# Patient Record
Sex: MALE | Race: ASIAN | NOT HISPANIC OR LATINO | Employment: UNEMPLOYED | URBAN - METROPOLITAN AREA
[De-identification: names, ages, dates, MRNs, and addresses within clinical notes are randomized per-mention and may not be internally consistent; named-entity substitution may affect disease eponyms.]

---

## 2017-03-31 ENCOUNTER — OFFICE VISIT - HEALTHEAST (OUTPATIENT)
Dept: FAMILY MEDICINE | Facility: CLINIC | Age: 22
End: 2017-03-31

## 2017-03-31 DIAGNOSIS — L30.9 PERIANAL DERMATITIS: ICD-10-CM

## 2017-03-31 RX ORDER — TRIAMCINOLONE ACETONIDE 1 MG/G
CREAM TOPICAL
Qty: 30 G | Refills: 1 | Status: SHIPPED | OUTPATIENT
Start: 2017-03-31

## 2021-05-30 VITALS — BODY MASS INDEX: 23.76 KG/M2 | WEIGHT: 145 LBS

## 2021-06-09 NOTE — PROGRESS NOTES
Subjective:    Ban Sullivan is seen today for irritation perianal region.  Question some mild constipation.  Possible bump in this area.  No history of hemorrhoids.  No bleeding with wiping.  No abdominal discomfort.  No other skin concerns.    No past surgical history on file.     Family History   Problem Relation Age of Onset     No Medical Problems Mother      No Medical Problems Father      Diabetes Paternal Grandmother         No past medical history on file.     Social History   Substance Use Topics     Smoking status: Never Smoker     Smokeless tobacco: None     Alcohol use 0.6 oz/week     1 Cans of beer per week        Current Outpatient Prescriptions   Medication Sig Dispense Refill     triamcinolone (KENALOG) 0.1 % cream apply topically to affected area twice daily as needed 30 g 1     No current facility-administered medications for this visit.           Objective:    Vitals:    03/31/17 1122   BP: 100/60   Pulse: 72   Weight: 145 lb (65.8 kg)      Body mass index is 23.76 kg/(m^2).    Alert.  No apparent distress.  Perianal skin with mild irritation without anal fissure.  No significant external hemorrhoid tissue, skin tag, etc.      Assessment:    1. Perianal dermatitis  triamcinolone (KENALOG) 0.1 % cream         Plan:    Discussed mild perianal dermatitis.  Triamcinolone 0.1% cream sparingly twice daily to affected areas.  No current evidence for significant hemorrhoid tissue etc.  Avoid itch scratch cycle.  Discussed potential for food allergy, caffeine, etc. as a contributing factor and will monitor closely.

## 2022-07-06 ENCOUNTER — VIRTUAL VISIT (OUTPATIENT)
Dept: FAMILY MEDICINE | Facility: CLINIC | Age: 27
End: 2022-07-06

## 2022-07-06 DIAGNOSIS — S69.91XD HAND INJURY, RIGHT, SUBSEQUENT ENCOUNTER: Primary | ICD-10-CM

## 2022-07-06 DIAGNOSIS — F41.1 GENERALIZED ANXIETY DISORDER: ICD-10-CM

## 2022-07-06 PROCEDURE — 99203 OFFICE O/P NEW LOW 30 MIN: CPT | Mod: 95 | Performed by: PHYSICIAN ASSISTANT

## 2022-07-06 NOTE — PATIENT INSTRUCTIONS
Orthopedic referral    Therapy referrral    Take tylenol as needed for pain up to 1000mg three times daily, do not exceed 3000mg in 24 hour period    Take ibuprofen as needed for pain up to 600mg three times daily with food

## 2022-07-06 NOTE — PROGRESS NOTES
Ban is a 27 year old who is being evaluated via a billable video visit.      How would you like to obtain your AVS? [employer]  If the video visit is dropped, the invitation should be resent by: [employer] VIDEO   Will anyone else be joining your video visit? No      1:45-1:55    Assessment and Plan:     (S69.91XD) Hand injury, right, subsequent encounter  (primary encounter diagnosis)  Comment: jammed playing basketball several months ago, notes ongoing swelling and pain  Plan: Orthopedic  Referral  Ice, tylenol and/or ibuprofen     (F41.1) Generalized anxiety disorder  Comment: intermittent anxiety and depression symptoms, no SI or HI, declined medication at this time, would like to talk to therapist   Plan: Adult Mental Health  Referral      Blanca Kraus PA-C      Sisi Cevallos is a 27 year old presenting for the following health issues:  No chief complaint on file.      He jammed his right ring finger while playing basketball in February  He had an x-ray in April which was negative for broken bone   He has had ongoing pain and swelling in the area since the injury, he has not seen orthopedics for this before.  He also notes that he has had intermittent symptoms of anxiety and depression.  He denies any concrete inciting event but notes the pandemic, job changes etc. as sources of stress for him.  He does not want to start medication at this time but would like to talk to a therapist, he does have a supportive family network of friends in the area.  He denies any suicidal or homicidal ideation.  He also denies any history of substance abuse issues.    Review of Systems   See above      Objective    Vitals - Patient Reported  Pain Score: Extreme Pain (8)  Pain Loc:  (hand - fingers (ring and pinky finger))      Vitals:  No vitals were obtained today due to virtual visit.    Physical Exam   GENERAL: Healthy, alert and no distress  EYES: Eyes grossly normal to inspection.  No discharge or  erythema, or obvious scleral/conjunctival abnormalities.  RESP: No audible wheeze, cough, or visible cyanosis.  No visible retractions or increased work of breathing.    SKIN: Visible skin clear. No significant rash, abnormal pigmentation or lesions.  NEURO: Cranial nerves grossly intact.  Mentation and speech appropriate for age.  PSYCH: Mentation appears normal, affect normal/bright, judgement and insight intact, normal speech and appearance well-groomed.  EXT: swelling just proximal to 4th/5th webspace on right hand          Video-Visit Details    Video Start Time: 1:45    Type of service:  Video Visit    Video End Time:1:55    Originating Location (pt. Location): Home    Distant Location (provider location):  [facility]     Platform used for Video Visit: [employer]    .  Cayden.

## 2022-07-08 ENCOUNTER — OFFICE VISIT (OUTPATIENT)
Dept: ORTHOPEDICS | Facility: CLINIC | Age: 27
End: 2022-07-08
Attending: PHYSICIAN ASSISTANT

## 2022-07-08 ENCOUNTER — ANCILLARY PROCEDURE (OUTPATIENT)
Dept: GENERAL RADIOLOGY | Facility: CLINIC | Age: 27
End: 2022-07-08
Attending: ORTHOPAEDIC SURGERY
Payer: COMMERCIAL

## 2022-07-08 VITALS
DIASTOLIC BLOOD PRESSURE: 87 MMHG | SYSTOLIC BLOOD PRESSURE: 127 MMHG | HEART RATE: 73 BPM | HEIGHT: 66 IN | WEIGHT: 135.4 LBS | BODY MASS INDEX: 21.76 KG/M2

## 2022-07-08 DIAGNOSIS — S69.91XD HAND INJURY, RIGHT, SUBSEQUENT ENCOUNTER: Primary | ICD-10-CM

## 2022-07-08 DIAGNOSIS — S69.91XD HAND INJURY, RIGHT, SUBSEQUENT ENCOUNTER: ICD-10-CM

## 2022-07-08 PROCEDURE — 99243 OFF/OP CNSLTJ NEW/EST LOW 30: CPT | Performed by: ORTHOPAEDIC SURGERY

## 2022-07-08 PROCEDURE — 73140 X-RAY EXAM OF FINGER(S): CPT | Mod: TC | Performed by: RADIOLOGY

## 2022-07-08 ASSESSMENT — PAIN SCALES - GENERAL: PAINLEVEL: SEVERE PAIN (6)

## 2022-07-08 NOTE — PROGRESS NOTES
Chief Complaint:   Chief Complaint   Patient presents with     Right Ring Finger - Pain     Onset: 2/2022. Patient injured his finger while playing basketball. It happened very quickly. Pain is in between the ring and little finger palm side. He has some pain with flexion and lifting things. Am can be painful. He has been using ice.       Ban Sullivan is seen today in the [facility] for evaluation of right ring finger injury at the request of Blanca Kraus PA-C      HPI: Ban Sullivan is a 27 year old male , right -hand dominant, who presents for evaluation and management of a right  ring finger injury. He injured his hand 2/2022, while playing basketball, jammed the finger. He's not exactly sure how/what happened, it all happened so quickly. Was able to keep playing. Wasn't until a day or two later knew something happened, but just assumed it would go away, but hasn't.    Continues with pain between the ring/small finger in the palm. Pain with bending the fingers or lifting things. Can be painful in the morning. Treatment has been ice.    It has been 5 months since the initial injury.       He reports having mild pain/discomfort around the injury site. He denies numbness or tingling. He denies any other injuries to his upper extremity.     Symptoms: pain.  Location: right hand in the palm.  Pain severity: 6/10  Pain quality: aching and sharp  Frequency of symptoms: are constant.  Aggravating factors: bending the finger, gripping.  Relieving factors: decreased activities.    Previous treatment: ice    Past medical history:  has no past medical history on file.   Patient Active Problem List   Diagnosis     Nonspecific reaction to tuberculin skin test without active tuberculosis     Dermatitis     Skin Lesion       Past surgical history:  has no past surgical history on file.     Medications:   Current Outpatient Medications:      triamcinolone (KENALOG) 0.1 % cream,  "[TRIAMCINOLONE (KENALOG) 0.1 % CREAM] apply topically to affected area twice daily as needed (Patient not taking: Reported on 7/6/2022), Disp: 30 g, Rfl: 1      Allergies:     Allergies   Allergen Reactions     Amoxicillin Unknown        Family History: family history includes Diabetes in his paternal grandmother; No Known Problems in his father and mother.     Social History:  reports that he has never smoked. He does not have any smokeless tobacco history on file. He reports current alcohol use of about 1.0 standard drink of alcohol per week. He reports that he does not use drugs.    Review of Systems:  ROS: 10 point ROS neg other than the symptoms noted above in the HPI and past medical history.    Physical Exam  GENERAL APPEARANCE: healthy, alert, no distress.   SKIN: no suspicious lesions or rashes  NEURO: Normal strength and tone, mentation intact and speech normal  PSYCH:  mentation appears normal and affect normal. Not anxious.  RESPIRATORY: No increased work of breathing.    /87   Pulse 73   Ht 1.664 m (5' 5.5\")   Wt 61.4 kg (135 lb 6.4 oz)   BMI 22.19 kg/m       right HAND EXAM:    Skin intact. No open wounds.  There is no swelling in the thumb, index, long (middle), ring and small fingers.  There is moderate tenderness in the palm over the flexor tendon of the ring finger, just distal to the A1 pulley to the metacarpal phalangeal joint flexion crease. Remainder of the flexor tendon of the finger distal to the metacarpal phalangeal joint flexion crease or proximally to the A1 pulley in nontender to palpation.  There is no active triggering.  No obvious bowstringing.  Isolated DIP, PIP, and metacarpal phalangeal joint flexion of the ring finger is intact.  No palpable nodules.  There is no ecchymosis.  There is no erythema of the surrounding skin.  There is no maceration of the skin.  There is otherwise no deformity in the area.  Range of motion: full, and (any)movements are minimally " painful.  Intact sensation median, radial, ulnar nerves of the hand, and intact sensation to light touch radial and ulnar digital nerves to fingers.  Brisk capillary refill to all fingers.   Palpable radial pulse, 2+  Intact epl fpl fdp fds edc wrist flexion/extenion biceps triceps deltoid.    X-rays:  3 views right ring finger from 7/8/2022 were reviewed personally in clinic today. On my review of the Xrays, No obvious fracture or dislocation. No obvious bony abnormality or lesion. .    Impression:  26yo RHD male with right ring finger injury, suspect partial pulley injury    Plan:  * exam, images reviewed  * no obvious tendon injury  * suspect a pulley injury, possibly A2. nonsurgical.  * will treat with massage, range of motion exercises, activity modification, buddy taping  * will refer to hand therapy    return to clinic as needed.    Aneesh Lorenzana M.D., M.S.  Dept. of Orthopaedic Surgery  [facility]

## 2022-07-08 NOTE — LETTER
7/8/2022         RE: Ban Sullivan  [address]  [place] [address]        Dear Colleague,    Thank you for referring your patient, Ban Sullivan, to the [facility]. Please see a copy of my visit note below.    Chief Complaint:   Chief Complaint   Patient presents with     Right Ring Finger - Pain     Onset: 2/2022. Patient injured his finger while playing basketball. It happened very quickly. Pain is in between the ring and little finger palm side. He has some pain with flexion and lifting things. Am can be painful. He has been using ice.       Ban Sullivan is seen today in the [facility] for evaluation of right ring finger injury at the request of Blanca Kraus PA-C      HPI: Ban Sullivan is a 27 year old male , right -hand dominant, who presents for evaluation and management of a right  ring finger injury. He injured his hand 2/2022, while playing basketball, jammed the finger. He's not exactly sure how/what happened, it all happened so quickly. Was able to keep playing. Wasn't until a day or two later knew something happened, but just assumed it would go away, but hasn't.    Continues with pain between the ring/small finger in the palm. Pain with bending the fingers or lifting things. Can be painful in the morning. Treatment has been ice.    It has been 5 months since the initial injury.       He reports having mild pain/discomfort around the injury site. He denies numbness or tingling. He denies any other injuries to his upper extremity.     Symptoms: pain.  Location: right hand in the palm.  Pain severity: 6/10  Pain quality: aching and sharp  Frequency of symptoms: are constant.  Aggravating factors: bending the finger, gripping.  Relieving factors: decreased activities.    Previous treatment: ice    Past medical history:  has no past medical history on file.   Patient Active Problem List   Diagnosis     Nonspecific reaction to  "tuberculin skin test without active tuberculosis     Dermatitis     Skin Lesion       Past surgical history:  has no past surgical history on file.     Medications:   Current Outpatient Medications:      triamcinolone (KENALOG) 0.1 % cream, [TRIAMCINOLONE (KENALOG) 0.1 % CREAM] apply topically to affected area twice daily as needed (Patient not taking: Reported on 7/6/2022), Disp: 30 g, Rfl: 1      Allergies:     Allergies   Allergen Reactions     Amoxicillin Unknown        Family History: family history includes Diabetes in his paternal grandmother; No Known Problems in his father and mother.     Social History:  reports that he has never smoked. He does not have any smokeless tobacco history on file. He reports current alcohol use of about 1.0 standard drink of alcohol per week. He reports that he does not use drugs.    Review of Systems:  ROS: 10 point ROS neg other than the symptoms noted above in the HPI and past medical history.    Physical Exam  GENERAL APPEARANCE: healthy, alert, no distress.   SKIN: no suspicious lesions or rashes  NEURO: Normal strength and tone, mentation intact and speech normal  PSYCH:  mentation appears normal and affect normal. Not anxious.  RESPIRATORY: No increased work of breathing.    /87   Pulse 73   Ht 1.664 m (5' 5.5\")   Wt 61.4 kg (135 lb 6.4 oz)   BMI 22.19 kg/m       right HAND EXAM:    Skin intact. No open wounds.  There is no swelling in the thumb, index, long (middle), ring and small fingers.  There is moderate tenderness in the palm over the flexor tendon of the ring finger, just distal to the A1 pulley to the metacarpal phalangeal joint flexion crease. Remainder of the flexor tendon of the finger distal to the metacarpal phalangeal joint flexion crease or proximally to the A1 pulley in nontender to palpation.  There is no active triggering.  No obvious bowstringing.  Isolated DIP, PIP, and metacarpal phalangeal joint flexion of the ring finger is intact.  No " palpable nodules.  There is no ecchymosis.  There is no erythema of the surrounding skin.  There is no maceration of the skin.  There is otherwise no deformity in the area.  Range of motion: full, and (any)movements are minimally painful.  Intact sensation median, radial, ulnar nerves of the hand, and intact sensation to light touch radial and ulnar digital nerves to fingers.  Brisk capillary refill to all fingers.   Palpable radial pulse, 2+  Intact epl fpl fdp fds edc wrist flexion/extenion biceps triceps deltoid.    X-rays:  3 views right ring finger from 7/8/2022 were reviewed personally in clinic today. On my review of the Xrays, No obvious fracture or dislocation. No obvious bony abnormality or lesion. .    Impression:  28yo RHD male with right ring finger injury, suspect partial pulley injury    Plan:  * exam, images reviewed  * no obvious tendon injury  * suspect a pulley injury, possibly A2. nonsurgical.  * will treat with massage, range of motion exercises, activity modification, buddy taping  * will refer to hand therapy    return to clinic as needed.    Aneesh Lorenzana M.D., M.S.  Dept. of Orthopaedic Surgery  [facility]        Again, thank you for allowing me to participate in the care of your patient.        Sincerely,        Aneesh Lorenzana MD

## 2022-07-24 ENCOUNTER — HEALTH MAINTENANCE LETTER (OUTPATIENT)
Age: 27
End: 2022-07-24

## 2022-09-08 ENCOUNTER — FCC EXTENDED DOCUMENTATION (OUTPATIENT)
Dept: PSYCHOLOGY | Facility: CLINIC | Age: 27
End: 2022-09-08

## 2022-09-08 ENCOUNTER — VIRTUAL VISIT (OUTPATIENT)
Dept: PSYCHOLOGY | Facility: CLINIC | Age: 27
End: 2022-09-08
Attending: PHYSICIAN ASSISTANT
Payer: COMMERCIAL

## 2022-09-08 DIAGNOSIS — F43.23 ADJUSTMENT DISORDER WITH MIXED ANXIETY AND DEPRESSED MOOD: Primary | ICD-10-CM

## 2022-09-08 PROCEDURE — 90834 PSYTX W PT 45 MINUTES: CPT | Mod: 95 | Performed by: SOCIAL WORKER

## 2022-09-08 NOTE — PROGRESS NOTES
[facility]   Mental Health & Addiction Services     Progress Note - Initial Visit    Patient  Name:  Ban Sullivan Date: 2022         Service Type: Individual     Visit Start Time: 100  Visit End Time: 150    Visit #: 1    Attendees: Client attended alone    Service Modality:  Video Visit:      Provider verified identity through the following two step process.  Patient provided:  Patient     Telemedicine Visit: The patient's condition can be safely assessed and treated via synchronous audio and visual telemedicine encounter.      Reason for Telemedicine Visit: Patient has requested telehealth visit    Originating Site (Patient Location): Patient's home    Distant Site (Provider Location): Provider Remote Setting- Home Office    Consent:  The patient/guardian has verbally consented to: the potential risks and benefits of telemedicine (video visit) versus in person care; bill my insurance or make self-payment for services provided; and responsibility for payment of non-covered services.     Patient would like the video invitation sent by:  My Chart    Mode of Communication:  Video Conference via [employer]    As the provider I attest to compliance with applicable laws and regulations related to telemedicine.       DATA:   Interactive Complexity: No   Crisis: No     Presenting Concerns/  Current Stressors:   Stress anxiety and depression, stage of life transitions      ASSESSMENT:  Mental Status Assessment:  Appearance:   Appropriate   Eye Contact:   Good   Psychomotor Behavior: Normal   Attitude:   Cooperative  Friendly Pleasant  Orientation:   All  Speech   Rate / Production: Normal/ Responsive   Volume:  Normal   Mood:    Normal  Affect:    Appropriate   Thought Content:  Clear   Thought Form:  Coherent   Insight:    Good       Safety Issues and Plan for Safety and Risk Management:   [place] Suicide Severity Rating Scale (Short Version)No flowsheet data found.  Patient denies current fears or  concerns for personal safety.  Patient denies current or recent suicidal ideation or behaviors.  Patient denies current or recent homicidal ideation or behaviors.  Patient denies current or recent self injurious behavior or ideation.  Patient denies other safety concerns.  Recommended that patient call 911 or go to the local ED should there be a change in any of these risk factors.  Patient reports there are no firearms in the house.     Diagnostic Criteria:  Adjustment Disorder  A. The development of emotional or behavioral symptoms in response to an identifiable stressor(s) occurring within 3 months of the onset of the stressor(s)  B. These symptoms or behaviors are clinically significant, as evidenced by one or both of the following:       - Marked distress that is out of proportion to the severity/intensity of the stressor (with consideration for external context & culture)       - Significant impairment in social, occupational, or other important areas of functioning  C. The stress-related disturbance does not meet criteria for another disorder & is not not an exacerbation of another mental disorder  D. The symptoms do not represent normal bereavement  E. Once the stressor or its consequences have terminated, the symptoms do not persist for more than an additional 6 months       * Adjustment Disorder with Mixed Anxiety and Depressed Mood: The predominant manifestation is a combination of depression and anxiety      DSM5 Diagnoses: (Sustained by DSM5 Criteria Listed Above)  Diagnoses: Adjustment Disorders  309.28 (F43.23) With mixed anxiety and depressed mood  Psychosocial & Contextual Factors: lives with sister, currently unemployed, good support system  WHODAS 2.0 (12 item):   WHODAS 2.0 Total Score 9/7/2022   Total Score 14   Total Score MyChart 14     Intervention:   Educated on treatment planning and started identifying goals and interventions for treatment plan  Collateral Reports Completed:  Not  Applicable      PLAN: (Homework, other):  1. Provider will continue Diagnostic Assessment.  Patient was given the following to do until next session:  Reach out between sessions with questions if needed, follow up in two weeks    2. Provider recommended the following referrals: NA.      3.  Suicide Risk and Safety Concerns were assessed for Ban Sullivan.    Patient meets the following risk assessment and triage: Patient denied any current/recent/lifetime history of suicidal ideation and/or behaviors.  No safety plan indicated at this time.       Kathy Garcia, [facility]  September 8, 2022

## 2022-09-19 ENCOUNTER — VIRTUAL VISIT (OUTPATIENT)
Dept: PSYCHOLOGY | Facility: CLINIC | Age: 27
End: 2022-09-19
Payer: COMMERCIAL

## 2022-09-19 DIAGNOSIS — F43.23 ADJUSTMENT DISORDER WITH MIXED ANXIETY AND DEPRESSED MOOD: Primary | ICD-10-CM

## 2022-09-19 PROCEDURE — 90791 PSYCH DIAGNOSTIC EVALUATION: CPT | Mod: 95 | Performed by: SOCIAL WORKER

## 2022-09-19 ASSESSMENT — PATIENT HEALTH QUESTIONNAIRE - PHQ9
SUM OF ALL RESPONSES TO PHQ QUESTIONS 1-9: 2
5. POOR APPETITE OR OVEREATING: NOT AT ALL

## 2022-09-19 ASSESSMENT — ANXIETY QUESTIONNAIRES
6. BECOMING EASILY ANNOYED OR IRRITABLE: NOT AT ALL
3. WORRYING TOO MUCH ABOUT DIFFERENT THINGS: SEVERAL DAYS
GAD7 TOTAL SCORE: 3
5. BEING SO RESTLESS THAT IT IS HARD TO SIT STILL: NOT AT ALL
1. FEELING NERVOUS, ANXIOUS, OR ON EDGE: SEVERAL DAYS
7. FEELING AFRAID AS IF SOMETHING AWFUL MIGHT HAPPEN: NOT AT ALL
GAD7 TOTAL SCORE: 3
2. NOT BEING ABLE TO STOP OR CONTROL WORRYING: SEVERAL DAYS

## 2022-09-19 NOTE — PROGRESS NOTES
"    [facility]  Provider Name:  PAYTON Dubois, LICSW    PATIENT'S NAME: Ban Sullivan  PREFERRED NAME: Ban  PRONOUNS:     he/his  MRN: [id number]  : 1995  ADDRESS: [address] [place] [address] [address] [place] [address]  ACCT. NUMBER:  [id number]  DATE OF SERVICE: 22  START TIME: 105  END TIME: 155  PREFERRED PHONE: [phone]  May we leave a program related message: Yes  SERVICE MODALITY:  Video Visit:      Provider verified identity through the following two step process.  Patient provided:  Patient     Telemedicine Visit: The patient's condition can be safely assessed and treated via synchronous audio and visual telemedicine encounter.      Reason for Telemedicine Visit: Patient has requested telehealth visit    Originating Site (Patient Location): Patient's home    Distant Site (Provider Location): Provider Remote Setting- Home Office    Consent:  The patient/guardian has verbally consented to: the potential risks and benefits of telemedicine (video visit) versus in person care; bill my insurance or make self-payment for services provided; and responsibility for payment of non-covered services.     Patient would like the video invitation sent by:  My Chart    Mode of Communication:  Video Conference via mySugr    As the provider I attest to compliance with applicable laws and regulations related to telemedicine.    UNIVERSAL ADULT Mental Health DIAGNOSTIC ASSESSMENT    Identifying Information:  Patient is a 27 year old,  Hmong individual.  Patient was referred for an assessment by self.  Patient attended the session alone.    Chief Complaint:   The reason for seeking services at this time is: \"Someone to talk to\".  The problem(s) began 2022.  Had been noticing that he's been taking out feeling on family members (sad, mad, etc.). Notices that this has taken a seasonal pattern, feels he may have possible seasonal affective disorder. He reports a strong depressive like episode " "with anxiety that he reports affected him over the summer.  At this time things are feeling pretty manageable but he would like supports and coping skills to ensure things stay this way.     Patient has attempted to resolve these concerns in the past through had one therapy session in the past.    Social/Family History:  Patient reported they grew up in [facility]  .  Moved back and forth from [place] to [place].  They were raised by biological parents  .  Parents one or both remarried.  Dad and biological mom  when Ban was 8 yo.  Ban's dad remarried and Ban call's dad's partner mom.  He is closer with step-mom than biological mom.  He states \"we love and see each other\" abotu dad.\"  He also states it's been 11 or 12 years since he has seen or talked to his biological mom.  States there are a lot of legal issues between mom and dad and that kids have felt at times that they've been caught in the middle.  He has two sisters and two brothers, aBn is the middle child (second oldest).  Growing up siblings were not close, however, they are closer now as adults.  They all live in the [facility], all family is in [place].  Patient reported that their childhood was lonely, happy in some ways, strange.  Patient described their current relationships with family of origin as somewhat close with some family members, distant with others.     The patient describes their cultural background as Hmong.  Cultural influences and impact on patient's life structure, values, norms, and healthcare: None.  Contextual influences on patient's health include: NA.  These factors will be addressed in the Preliminary Treatment plan. Patient identified their preferred language to be English. Patient reported they does not need the assistance of an  or other support involved in therapy.     Patient reported had no significant delays in developmental tasks.  School was okay, Ban states it was something he felt he " had to do, but didn't enjoy going, especially because he moved so much growing up and so it was hard to make and keep friends.  He states he made friends in eighth grade and was happy where he was at, but then ended up going to a different high school than the friends he made.  He had good grades in high school.  Patient's highest education level was college graduate  , he went to [employer] for his associates degree and then transferred to [employer].  Got his degree in Psychology.   After college was working as an  at a charter school.  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  Was pushed out of this job due to someone with higher seniority wanting the position which led him to be unemployed currently.  Is not sure about going back to work in the school system, stating he is feeling pretty burnt out and that it may be leading to some of his depression symptoms.  He has interviewed for another position at a high school, but is feeling like this isn't the route he'd like to pursue at this time.   Had a goal of becoming a school counselor.  Patient identified the following learning problems: none reported.  Modifications will not be used to assist communication in therapy.  Patient reports they are  able to understand written materials.    Patient reported the following relationship history: hasn't dated since high school, had a pretty traumatizing experience at this time (age 13 or 14).  Had a girlfriend at this time.  Patient's current relationship status is single for several years.  Patient identified their sexual orientation as heterosexual.  Patient reported having  0 child(wilson). Patient identified siblings as part of their support system.  Patient identified the quality of  these relationships as good.      Patient's current living/housing situation involves staying in own home/apartment (apartment).  He lives in [place] with his sister. The immediate members of family and household include Gael Cunningham, Serena,Sister  and they report that housing is stable.    Patient is currently unemployed.  Patient reports their finances are obtained through other. Patient does identify finances as a current stressor.      Patient reported that they have not been involved with the legal system.    Patient does not report being under probation/ parole/ jurisdiction. They are not under any current court jurisdiction.     Patient's Strengths and Limitations:  Patient identified the following strengths or resources that will help them succeed in treatment: family support and personal resilience, able to be focus on the present, good communication with others. Things that may interfere with the patient's success in treatment include: none identified.     Assessments:  The following assessments were completed by patient for this visit:  PHQ2:   PHQ-2 ( 1999 [employer]) 7/6/2022   Q1: Little interest or pleasure in doing things 1   Q2: Feeling down, depressed or hopeless 1   PHQ-2 Score 2   Q1: Little interest or pleasure in doing things Several days   Q2: Feeling down, depressed or hopeless Several days   PHQ-2 Score 2       PHQ9: No flowsheet data found.  GAD2:   VANESA-2 9/7/2022   Feeling nervous, anxious, or on edge 1   Not being able to stop or control worrying 1   VANESA-2 Total Score 2     GAD7: No flowsheet data found.  CAGE-AID:   CAGE-AID Total Score 9/7/2022   Total Score 2   Total Score MyChart 2 (A total score of 2 or greater is considered clinically significant)     PROMIS 10-Global Health (all questions and answers displayed):   PROMIS 10 9/7/2022   In general, would you say your health is: Fair   In general, would you say your quality of life is: Fair   In general, how would you rate your physical  health? Fair   In general, how would you rate your mental health, including your mood and your ability to think? Fair   In general, how would you rate your satisfaction with your social activities and relationships? Fair   In general, please rate how well you carry out your usual social activities and roles Fair   To what extent are you able to carry out your everyday physical activities such as walking, climbing stairs, carrying groceries, or moving a chair? Moderately   How often have you been bothered by emotional problems such as feeling anxious, depressed or irritable? Sometimes   How would you rate your fatigue on average? Moderate   How would you rate your pain on average?   0 = No Pain  to  10 = Worst Imaginable Pain 4   In general, would you say your health is: 2   In general, would you say your quality of life is: 2   In general, how would you rate your physical health? 2   In general, how would you rate your mental health, including your mood and your ability to think? 2   In general, how would you rate your satisfaction with your social activities and relationships? 2   In general, please rate how well you carry out your usual social activities and roles. (This includes activities at home, at work and in your community, and responsibilities as a parent, child, spouse, employee, friend, etc.) 2   To what extent are you able to carry out your everyday physical activities such as walking, climbing stairs, carrying groceries, or moving a chair? 3   In the past 7 days, how often have you been bothered by emotional problems such as feeling anxious, depressed, or irritable? 3   In the past 7 days, how would you rate your fatigue on average? 3   In the past 7 days, how would you rate your pain on average, where 0 means no pain, and 10 means worst imaginable pain? 4   Global Mental Health Score 9   Global Physical Health Score 11   PROMIS TOTAL - SUBSCORES 20   Some recent data might be hidden     PROMIS  10-Global Health (only subscores and total score):   PROMIS-10 Scores Only 9/7/2022   Global Mental Health Score 9   Global Physical Health Score 11   PROMIS TOTAL - SUBSCORES 20     Personal and Family Medical History:  Patient does not report a family history of mental health concerns.  Patient reports family history includes Diabetes in his paternal grandmother; No Known Problems in his father and mother.    Patient does not report Mental Health Diagnosis or Treatment.  Has dealt with suicidal thoughts in 2019, these thoughts were more active and Ban reports sleeping a lot during that time.  He made no attempts as he states his family was too important to him to have intent to act on any of the thoughts.  Exercise really helped as well.  He has not dealt with any suicidal thoughts since this time.    Patient has had a physical exam to rule out medical causes for current symptoms.  Date of last physical exam was within the past year. Client was encouraged to follow up with PCP if symptoms were to develop. The patient has a non-[place] Primary Care Provider. Their PCP is Dr. Cagle. Patient reports no current medical concerns.  Patient denies any issues with pain.  There are not significant appetite / nutritional concerns / weight changes.   Patient does not report a history of head injury / trauma / cognitive impairment.      Patient reports not taking any current medications    Medication Adherence:  Patient reports not currently prescribed.  NA.    Patient Allergies:    Allergies   Allergen Reactions     Amoxicillin Unknown       Medical History:  No past medical history on file.      Current Mental Status Exam:   Appearance:  Appropriate    Eye Contact:  Good   Psychomotor:  Normal       Gait / station:  no problem  Attitude / Demeanor: Cooperative  Friendly Pleasant  Speech      Rate / Production: Normal/ Responsive      Volume:  Normal  volume      Language:  good  Mood:   Normal  Affect:   Appropriate     Thought Content: Clear   Thought Process: Coherent  Logical       Associations: No loosening of associations  Insight:   Good   Judgment:  Intact   Orientation:  All  Attention/concentration: Good       Substance Use:  Patient did report a family history of substance use concerns; (uncle, father, and mom struggle with alcohol use), see medical history section for details.  Patient has not received chemical dependency treatment in the past.  Patient has not ever been to detox.      Patient is not currently receiving any chemical dependency treatment.         ]  Substance History of use Age of first use Date of last use     Pattern and duration of use (include amounts and frequency)   Alcohol currently use   14 9/5/2022 REPORTS SUBSTANCE USE: N/A; use at this time is not problematic, more social but used to be heavier   Cannabis   used in the past 23 5/1/2022 REPORTS SUBSTANCE USE: N/A     Amphetamines   never used     REPORTS SUBSTANCE USE: N/A   Cocaine/crack    never used       REPORTS SUBSTANCE USE: N/A   Hallucinogens never used         REPORTS SUBSTANCE USE: N/A   Inhalants never used         REPORTS SUBSTANCE USE: N/A   Heroin never used         REPORTS SUBSTANCE USE: N/A   Other Opiates never used     REPORTS SUBSTANCE USE: N/A   Benzodiazepine   never used     REPORTS SUBSTANCE USE: N/A   Barbiturates never used     REPORTS SUBSTANCE USE: N/A   Over the counter meds never used     REPORTS SUBSTANCE USE: N/A   Caffeine never used     REPORTS SUBSTANCE USE: N/A   Nicotine  never used     REPORTS SUBSTANCE USE: N/A   Other substances not listed above:  Identify:  never used     REPORTS SUBSTANCE USE: N/A     Patient reported the following problems as a result of their substance use: family problems.    Substance Use: No symptoms    Based on the positive CAGE score and clinical interview there  are indications of drug or alcohol abuse. patient declined further assessment at this time stating he is not  struggling with substence use currently..      Significant Losses / Trauma / Abuse / Neglect Issues:   Patient did not  serve in the .   There are indications or report of significant loss, trauma, abuse or neglect issues related to: are indications or report of significant loss, trauma, abuse or neglect issues related to and divorce / relational changes parents got ; relationship concerns.  Concerns for possible neglect are not present.     Safety Assessment:   Patient denies current homicidal ideation and behaviors.  Patient denies current self-injurious ideation and behaviors.    Patient denied risk behaviors associated with substance use.  Patient denies any high risk behaviors associated with mental health symptoms.  Patient reports the following current concerns for their personal safety: None.  Patient reports there are not firearms in the house.       There are no firearms in the home.    History of Safety Concerns:  Patient denied a history of homicidal ideation.     Patient denied a history of personal safety concerns.    Patient denied a history of assaultive behaviors.    Patient denied a history of sexual assault behaviors.     Patient denied a history of risk behaviors associated with substance use.  Patient denies any history of high risk behaviors associated with mental health symptoms.  Patient reports the following protective factors: purpose    Risk Plan:  See Recommendations for Safety and Risk Management Plan    Review of Symptoms per patient report:  Depression: Change in sleep  Vianey:  No Symptoms  Psychosis: No Symptoms  Anxiety: Sleep disturbance  Panic:  No symptoms  Post Traumatic Stress Disorder:  No Symptoms   Eating Disorder: No Symptoms  ADD / ADHD:  Inattentive  Conduct Disorder: No symptoms  Autism Spectrum Disorder: No symptoms  Obsessive Compulsive Disorder: No Symptoms    Patient reports the following compulsive behaviors and treatment history: NA.      Diagnostic  Criteria:   Adjustment Disorder  A. The development of emotional or behavioral symptoms in response to an identifiable stressor(s) occurring within 3 months of the onset of the stressor(s)  B. These symptoms or behaviors are clinically significant, as evidenced by one or both of the following:       - Marked distress that is out of proportion to the severity/intensity of the stressor (with consideration for external context & culture)       - Significant impairment in social, occupational, or other important areas of functioning  C. The stress-related disturbance does not meet criteria for another disorder & is not not an exacerbation of another mental disorder  D. The symptoms do not represent normal bereavement  E. Once the stressor or its consequences have terminated, the symptoms do not persist for more than an additional 6 months       * Adjustment Disorder with Mixed Anxiety and Depressed Mood: The predominant manifestation is a combination of depression and anxiety      Functional Status:  Patient reports the following functional impairments:  educational activities, management of the household and or completion of tasks, relationship(s), self-care, social interactions and work / vocational responsibilities.     Nonprogrammatic care:  Patient is requesting basic services to address current mental health concerns.    Clinical Summary:  1. Reason for assessment: provider referred due to adjustment difficulties over the course of the summer  .  2. Psychosocial, Cultural and Contextual Factors: lives with sister, good social supports  .  3. Principal DSM5 Diagnoses  (Sustained by DSM5 Criteria Listed Above):   Adjustment Disorders  309.28 (F43.23) With mixed anxiety and depressed mood.  4. Other Diagnoses that is relevant to services:   NA  5. Provisional Diagnosis:  NA  6. Prognosis: Maintain Current Status / Prevent Deterioration.  7. Likely consequences of symptoms if not treated: lack of ability to manage  symptoms or need a higher level of care when depressive type episode hits.  8. Client strengths include:  caring, educated, empathetic, goal-focused, good listener, insightful, intelligent and motivated .     Recommendations:     1. Plan for Safety and Risk Management:   Safety and Risk: Recommended that patient call 911 or go to the local ED should there be a change in any of these risk factors..          Report to child / adult protection services was NA.     2. Patient's identified NA.     3. Initial Treatment will focus on:    Depressed Mood - management of symptoms  Anxiety - management of symptoms.     4. Resources/Service Plan:    services are not indicated.   Modifications to assist communication are not indicated.   Additional disability accommodations are not indicated.      5. Collaboration:   Collaboration / coordination of treatment will be initiated with the following  support professionals: primary care physician.      6.  Referrals:   The following referral(s) will be initiated: NA. Next Scheduled Appointment: in 1 week.     A Release of Information has been obtained for the following: NA.     Emergency Contact  was obtained.     7. JOYCE:    JOYCE:  Discussed the general effects of drugs and alcohol on health and well-being. Provider gave patient printed information about the effects of chemical use on their health and well being. Recommendations:  Continue to cut back on use .     8. Records:   These were reviewed at time of assessment.   Information in this assessment was obtained from the medical record and  provided by patient who is a good historian.    Patient will have open access to their mental health medical record.        Provider Name/ Credentials:  PAYTON Dubois, LICSW    September 19, 2022

## 2022-09-30 ENCOUNTER — VIRTUAL VISIT (OUTPATIENT)
Dept: PSYCHOLOGY | Facility: CLINIC | Age: 27
End: 2022-09-30
Payer: COMMERCIAL

## 2022-09-30 DIAGNOSIS — Z91.199 NO-SHOW FOR APPOINTMENT: Primary | ICD-10-CM

## 2022-10-02 ENCOUNTER — HEALTH MAINTENANCE LETTER (OUTPATIENT)
Age: 27
End: 2022-10-02

## 2022-11-14 ENCOUNTER — VIRTUAL VISIT (OUTPATIENT)
Dept: PSYCHOLOGY | Facility: CLINIC | Age: 27
End: 2022-11-14
Payer: COMMERCIAL

## 2022-11-14 DIAGNOSIS — F43.23 ADJUSTMENT DISORDER WITH MIXED ANXIETY AND DEPRESSED MOOD: Primary | ICD-10-CM

## 2022-11-14 PROCEDURE — 90834 PSYTX W PT 45 MINUTES: CPT | Mod: 95 | Performed by: SOCIAL WORKER

## 2022-11-14 NOTE — PROGRESS NOTES
[facility]                                     Progress Note    Patient Name: Ban Sullivan  Date: 11/14/2022         Service Type: Individual      Session Start Time: 202  Session End Time: 245     Session Length: 38-52 mins    Session #: 3    Attendees: Client attended alone    Service Modality:  Video Visit:      Provider verified identity through the following two step process.  Patient provided:  Patient is known previously to provider    Telemedicine Visit: The patient's condition can be safely assessed and treated via synchronous audio and visual telemedicine encounter.      Reason for Telemedicine Visit: Patient has requested telehealth visit    Originating Site (Patient Location): Patient's home    Distant Site (Provider Location): [facility]    Consent:  The patient/guardian has verbally consented to: the potential risks and benefits of telemedicine (video visit) versus in person care; bill my insurance or make self-payment for services provided; and responsibility for payment of non-covered services.     Patient would like the video invitation sent by:  My Chart    Mode of Communication:  Video Conference via [employer]    Distant Location (Provider):  On-site    As the provider I attest to compliance with applicable laws and regulations related to telemedicine.    DATA  Interactive Complexity: No  Crisis: No        Progress Since Last Session (Related to Symptoms / Goals / Homework):   Symptoms: Improving mood is improving not a lot of anxiety or depression symptoms    Homework: Completed in session      Episode of Care Goals: Satisfactory progress - PREPARATION (Decided to change - considering how); Intervened by negotiating a change plan and determining options / strategies for behavior change, identifying triggers, exploring social supports, and working towards setting a date to begin behavior change     Current / Ongoing  Stressors and Concerns:   Looking for jobs     Treatment Objective(s) Addressed in This Session:   treatment planning  Objective #A     Intervention:   Behavioral Activation    Assessments completed prior to visit:  The following assessments were completed by patient for this visit:  PHQ2:   PHQ-2 ( 1999 [employer]) 7/6/2022   Q1: Little interest or pleasure in doing things 1   Q2: Feeling down, depressed or hopeless 1   PHQ-2 Score 2   Q1: Little interest or pleasure in doing things Several days   Q2: Feeling down, depressed or hopeless Several days   PHQ-2 Score 2     PHQ9:   PHQ-9 SCORE 9/19/2022   PHQ-9 Total Score 2     GAD2:   VANESA-2 9/7/2022   Feeling nervous, anxious, or on edge 1   Not being able to stop or control worrying 1   VANESA-2 Total Score 2     GAD7:   VANESA-7 SCORE 9/19/2022   Total Score 3     CAGE-AID:   CAGE-AID Total Score 9/7/2022   Total Score 2   Total Score MyChart 2 (A total score of 2 or greater is considered clinically significant)     PROMIS 10-Global Health (all questions and answers displayed):   PROMIS 10 9/7/2022   In general, would you say your health is: Fair   In general, would you say your quality of life is: Fair   In general, how would you rate your physical health? Fair   In general, how would you rate your mental health, including your mood and your ability to think? Fair   In general, how would you rate your satisfaction with your social activities and relationships? Fair   In general, please rate how well you carry out your usual social activities and roles Fair   To what extent are you able to carry out your everyday physical activities such as walking, climbing stairs, carrying groceries, or moving a chair? Moderately   How often have you been bothered by emotional problems such as feeling anxious, depressed or irritable? Sometimes   How would you rate your fatigue on average? Moderate   How would you rate your pain on average?   0 = No Pain  to  10 = Worst Imaginable Pain 4    In general, would you say your health is: 2   In general, would you say your quality of life is: 2   In general, how would you rate your physical health? 2   In general, how would you rate your mental health, including your mood and your ability to think? 2   In general, how would you rate your satisfaction with your social activities and relationships? 2   In general, please rate how well you carry out your usual social activities and roles. (This includes activities at home, at work and in your community, and responsibilities as a parent, child, spouse, employee, friend, etc.) 2   To what extent are you able to carry out your everyday physical activities such as walking, climbing stairs, carrying groceries, or moving a chair? 3   In the past 7 days, how often have you been bothered by emotional problems such as feeling anxious, depressed, or irritable? 3   In the past 7 days, how would you rate your fatigue on average? 3   In the past 7 days, how would you rate your pain on average, where 0 means no pain, and 10 means worst imaginable pain? 4   Global Mental Health Score 9   Global Physical Health Score 11   PROMIS TOTAL - SUBSCORES 20   Some recent data might be hidden     PROMIS 10-Global Health (only subscores and total score):   PROMIS-10 Scores Only 9/7/2022   Global Mental Health Score 9   Global Physical Health Score 11   PROMIS TOTAL - SUBSCORES 20     [place] Suicide Severity Rating Scale (Lifetime/Recent)No flowsheet data found.  [place] Suicide Severity Rating Scale (Short Version)No flowsheet data found.      ASSESSMENT: Current Emotional / Mental Status (status of significant symptoms):   Risk status (Self / Other harm or suicidal ideation)   Patient denies current fears or concerns for personal safety.   Patient denies current or recent suicidal ideation or behaviors.   Patient denies current or recent homicidal ideation or behaviors.   Patient denies current or recent self injurious behavior or  ideation.   Patient denies other safety concerns.   Patient reports there has been no change in risk factors since their last session.     Patient reports there has been no change in protective factors since their last session.     Recommended that patient call 911 or go to the local ED should there be a change in any of these risk factors.     Appearance:   Appropriate    Eye Contact:   Good    Psychomotor Behavior: Normal    Attitude:   Cooperative  Pleasant   Orientation:   All   Speech    Rate / Production: Normal     Volume:  Normal    Mood:    Normal   Affect:    Appropriate    Thought Content:  Clear    Thought Form:  Coherent  Logical    Insight:    Good      Medication Review:   No current psychiatric medications prescribed     Medication Compliance:   NA     Changes in Health Issues:   None reported     Chemical Use Review:   Substance Use: Chemical use reviewed, no active concerns identified      Tobacco Use: No current tobacco use.      Diagnosis:  Adjustment disorder with mixed anxiety and depressed mood     Collateral Reports Completed:   Not Applicable    PLAN: (Patient Tasks / Therapist Tasks / Other)  Fill out behavioral activation calendar at least 1x before next session.        Kathy Garcia, [facility]                                                         ______________________________________________________________________    Individual Treatment Plan    Patient's Name: Ban Sullivan  YOB: 1995    Date of Creation: 11/14/2022  Date Treatment Plan Last Reviewed/Revised: 11/14/2022    DSM5 Diagnoses: Adjustment Disorders  309.28 (F43.23) With mixed anxiety and depressed mood  Psychosocial / Contextual Factors: feels moods take a negative turn at the changes of the seasons,   PROMIS (reviewed every 90 days):     Referral / Collaboration:  Referral to another professional/service is not indicated at this time.    Anticipated number of session for this episode of care: 9-12  sessions  Anticipation frequency of session: Every other week/monthly  Anticipated Duration of each session: 38-52 minutes  Treatment plan will be reviewed in 90 days or when goals have been changed.       MeasurableTreatment Goal(s) related to diagnosis / functional impairment(s)  Goal 1: Patient will increase coping skills to manage depression/anxiety symptoms.    I will know I've met my goal when my mood has improved and I have more tools I can use to manage my emotions or mood.      Objective #A (Patient Action)     Patient will participate in 2-3 activities to improve mood. (Per week) - Ideas of reading, visiting nieces and nephews, basketball, and exercise, meditation).  Status: New - Date: 11/14/2022     Intervention(s)  Therapist will teach behavioral activation.    Objective #B   Patient will practice deep breathing at least 1x a day.  Status: New - Date: 11/14/2022     Intervention(s)   Therapist will teach 4 deep breathing exercises.    Objective #C  Patient will Improve concentration, focus, and mindfulness in daily activities .  Status: New - Date: 11/14/2022     Intervention(s)  Therapist will provide educational materials on mindfulness and mediation and practice together.          Patient has reviewed and agreed to the above plan.      Kathy Garcia, [facility]  November 14, 2022

## 2022-12-08 ENCOUNTER — VIRTUAL VISIT (OUTPATIENT)
Dept: PSYCHOLOGY | Facility: CLINIC | Age: 27
End: 2022-12-08
Payer: COMMERCIAL

## 2022-12-08 DIAGNOSIS — Z91.199 NO-SHOW FOR APPOINTMENT: Primary | ICD-10-CM

## 2023-08-12 ENCOUNTER — HEALTH MAINTENANCE LETTER (OUTPATIENT)
Age: 28
End: 2023-08-12

## 2024-10-05 ENCOUNTER — HEALTH MAINTENANCE LETTER (OUTPATIENT)
Age: 29
End: 2024-10-05